# Patient Record
Sex: FEMALE | Race: WHITE | NOT HISPANIC OR LATINO | Employment: FULL TIME | URBAN - NONMETROPOLITAN AREA
[De-identification: names, ages, dates, MRNs, and addresses within clinical notes are randomized per-mention and may not be internally consistent; named-entity substitution may affect disease eponyms.]

---

## 2017-02-17 ENCOUNTER — TRANSCRIBE ORDERS (OUTPATIENT)
Dept: ADMINISTRATIVE | Facility: HOSPITAL | Age: 57
End: 2017-02-17

## 2017-02-17 DIAGNOSIS — Z12.31 ENCOUNTER FOR SCREENING MAMMOGRAM FOR MALIGNANT NEOPLASM OF BREAST: Primary | ICD-10-CM

## 2017-02-17 PROCEDURE — G0123 SCREEN CERV/VAG THIN LAYER: HCPCS | Performed by: OBSTETRICS & GYNECOLOGY

## 2017-02-20 ENCOUNTER — LAB REQUISITION (OUTPATIENT)
Dept: LAB | Facility: HOSPITAL | Age: 57
End: 2017-02-20

## 2017-02-20 ENCOUNTER — HOSPITAL ENCOUNTER (OUTPATIENT)
Dept: MAMMOGRAPHY | Facility: HOSPITAL | Age: 57
Discharge: HOME | End: 2017-02-20
Attending: OBSTETRICS & GYNECOLOGY | Admitting: OBSTETRICS & GYNECOLOGY

## 2017-02-20 DIAGNOSIS — Z12.31 ENCOUNTER FOR SCREENING MAMMOGRAM FOR MALIGNANT NEOPLASM OF BREAST: ICD-10-CM

## 2017-02-20 DIAGNOSIS — Z12.72 ENCOUNTER FOR SCREENING FOR MALIGNANT NEOPLASM OF VAGINA: ICD-10-CM

## 2017-02-20 LAB
GEN CATEG CVX/VAG CYTO-IMP: NORMAL
LAB AP CASE REPORT: NORMAL
LAB AP GYN ADDITIONAL INFORMATION: NORMAL
Lab: NORMAL
PATH INTERP SPEC-IMP: NORMAL
STAT OF ADQ CVX/VAG CYTO-IMP: NORMAL

## 2017-02-20 PROCEDURE — G0202 SCR MAMMO BI INCL CAD: HCPCS

## 2017-02-20 PROCEDURE — 77063 BREAST TOMOSYNTHESIS BI: CPT

## 2018-05-08 ENCOUNTER — OFFICE VISIT (OUTPATIENT)
Dept: GASTROENTEROLOGY | Facility: CLINIC | Age: 58
End: 2018-05-08

## 2018-05-08 VITALS
BODY MASS INDEX: 31.18 KG/M2 | OXYGEN SATURATION: 98 % | WEIGHT: 176 LBS | HEIGHT: 63 IN | HEART RATE: 67 BPM | DIASTOLIC BLOOD PRESSURE: 72 MMHG | SYSTOLIC BLOOD PRESSURE: 144 MMHG | TEMPERATURE: 97.5 F

## 2018-05-08 DIAGNOSIS — Z12.11 COLON CANCER SCREENING: Primary | ICD-10-CM

## 2018-05-08 PROCEDURE — S0285 CNSLT BEFORE SCREEN COLONOSC: HCPCS | Performed by: NURSE PRACTITIONER

## 2018-05-08 RX ORDER — FUROSEMIDE 20 MG/1
20 TABLET ORAL DAILY
Status: ON HOLD | COMMUNITY
End: 2018-07-19

## 2018-05-08 RX ORDER — SODIUM, POTASSIUM,MAG SULFATES 17.5-3.13G
2 SOLUTION, RECONSTITUTED, ORAL ORAL ONCE
Qty: 2 BOTTLE | Refills: 0 | Status: SHIPPED | OUTPATIENT
Start: 2018-05-08 | End: 2018-05-08

## 2018-05-08 RX ORDER — LEVOTHYROXINE SODIUM 88 UG/1
88 TABLET ORAL DAILY
Status: ON HOLD | COMMUNITY
End: 2018-07-19

## 2018-05-08 RX ORDER — ASPIRIN 81 MG/1
81 TABLET ORAL DAILY
COMMUNITY

## 2018-05-08 RX ORDER — LOVASTATIN 10 MG/1
10 TABLET ORAL NIGHTLY
Status: ON HOLD | COMMUNITY
End: 2018-07-19

## 2018-05-08 RX ORDER — LOSARTAN POTASSIUM AND HYDROCHLOROTHIAZIDE 12.5; 5 MG/1; MG/1
1 TABLET ORAL DAILY
Status: ON HOLD | COMMUNITY
End: 2018-07-19

## 2018-05-08 RX ORDER — POTASSIUM CHLORIDE 750 MG/1
10 TABLET, EXTENDED RELEASE ORAL 2 TIMES DAILY
Status: ON HOLD | COMMUNITY
End: 2018-07-19

## 2018-05-08 NOTE — PROGRESS NOTES
Chief Complaint   Patient presents with   • Colon Cancer Screening     Patient is here today for colon screening.     Subjective   HPI  Lola Batres is a 58 y.o. female who presents as a referral for preventative maintenance. She has no complaints of nausea or vomiting. No change in bowels. No wt loss. No BRBPR. No melena. The patient is adopted and does not know of family hx for colon cancer. No abdominal pain.The patient's last colonoscopy was noted on 6/30/11 with diverticulosis only otherwise normal.  The patient does not carry a history of colon polyps  Past Medical History:   Diagnosis Date   • Diabetes mellitus    • HTN (hypertension)    • Hypothyroidism      Past Surgical History:   Procedure Laterality Date   • BREAST EXCISIONAL BIOPSY Left     dr. Amado   • CARPAL TUNNEL RELEASE     • CHOLECYSTECTOMY     • COLONOSCOPY  06/03/2011       Current Outpatient Prescriptions:   •  aspirin 81 MG EC tablet, Take 81 mg by mouth Daily., Disp: , Rfl:   •  furosemide (LASIX) 20 MG tablet, Take 20 mg by mouth Daily., Disp: , Rfl:   •  levothyroxine (SYNTHROID, LEVOTHROID) 88 MCG tablet, Take 88 mcg by mouth Daily., Disp: , Rfl:   •  losartan-hydrochlorothiazide (HYZAAR) 50-12.5 MG per tablet, Take 1 tablet by mouth Daily., Disp: , Rfl:   •  lovastatin (MEVACOR) 10 MG tablet, Take 10 mg by mouth Every Night., Disp: , Rfl:   •  potassium chloride (K-DUR,KLOR-CON) 10 MEQ CR tablet, Take 10 mEq by mouth 2 (Two) Times a Day., Disp: , Rfl:   •  SITagliptin-MetFORMIN HCl ER (JANUMET XR)  MG tablet, Take 1 tablet by mouth Daily., Disp: , Rfl:   •  sodium-potassium-magnesium sulfates (SUPREP BOWEL PREP KIT) 17.5-3.13-1.6 GM/180ML solution oral solution, Take 2 bottles by mouth 1 (One) Time for 1 dose. Split dose prep as directed by office instructions provided.  2 bottles = one kit., Disp: 2 bottle, Rfl: 0  Allergies   Allergen Reactions   • Hydrocodone-Acetaminophen Swelling   • Penicillins Rash     Social History  "    Social History   • Marital status:      Spouse name: N/A   • Number of children: N/A   • Years of education: N/A     Occupational History   • Not on file.     Social History Main Topics   • Smoking status: Former Smoker   • Smokeless tobacco: Never Used   • Alcohol use No   • Drug use: Unknown   • Sexual activity: Not on file     Other Topics Concern   • Not on file     Social History Narrative   • No narrative on file     Family History   Problem Relation Age of Onset   • Adopted: Yes       REVIEW OF SYSTEMS  General: well appearing, no fever chills or sweats, no unexplained wt loss  HEENT: no acute visual or hearing disturbances  Cardiovascular: No chest pain or palpitations  Pulmonary: No shortness of breath, coughing, wheezing or hemoptysis  : No burning, urgency, hematuria, or dysuria  Musculoskeletal: No joint pain or stiffness  Peripheral: no edema  Skin: No lesions or rashes  Neuro: No dizziness, headaches, stroke, syncope  Endocrine: No hot or cold intolerances  Hematological: No blood dyscrasias    Objective   Vitals:    05/08/18 0813   BP: 144/72   Pulse: 67   Temp: 97.5 °F (36.4 °C)   SpO2: 98%   Weight: 79.8 kg (176 lb)   Height: 160 cm (63\")     Body mass index is 31.18 kg/m².    PHYSICAL EXAM  General: age appropriate well nourished well appearing, no acute distress  Head: normocephalic and atraumatic  Global assessment-supple  Neck-No JVD noted, no lymphadenopathy  Pulmonary-clear to auscultation bilaterally, normal respiratory effort  Cardiovascular-normal rate and rhythm, normal heart sounds, S1 and S2 noted  Abdomen-soft, non tender, non distended, normal bowel sounds all 4 quadrants, no hepatosplenomegaly noted  Extremities-No clubbing cyanosis or edema  Neuro-Non focal, converses appropriately, awake, alert, oriented    Imaging Results (most recent)     None        Assessment/Plan   Lola was seen today for colon cancer screening.    Diagnoses and all orders for this " visit:    Colon cancer screening  -     Case Request; Standing    Other orders  -     Implement Anesthesia Orders Day of Procedure; Standing  -     Obtain Informed Consent; Standing  -     Verify bowel prep was successful; Standing  -     sodium-potassium-magnesium sulfates (SUPREP BOWEL PREP KIT) 17.5-3.13-1.6 GM/180ML solution oral solution; Take 2 bottles by mouth 1 (One) Time for 1 dose. Split dose prep as directed by office instructions provided.  2 bottles = one kit.      COLONOSCOPY WITH ANESTHESIA (N/A)    Body mass index is 31.18 kg/m². Patient's Body mass index is 31.18 kg/m². BMI is above normal parameters. Recommendations include: no follow-up required.      All risks, benefits, alternatives, and indications of colonoscopy procedure have been discussed with the patient. Risks to include perforation of the colon requiring possible surgery or colostomy, risk of bleeding from biopsies or removal of colon tissue, possibility of missing a colon polyp or cancer, or adverse drug reaction.  Benefits to include the diagnosis and management of disease of the colon and rectum. Alternatives to include barium enema, radiographic evaluation, lab testing or no intervention. Pt verbalizes understanding and agrees.

## 2018-07-19 ENCOUNTER — ANESTHESIA (OUTPATIENT)
Dept: GASTROENTEROLOGY | Facility: HOSPITAL | Age: 58
End: 2018-07-19

## 2018-07-19 ENCOUNTER — HOSPITAL ENCOUNTER (OUTPATIENT)
Facility: HOSPITAL | Age: 58
Setting detail: HOSPITAL OUTPATIENT SURGERY
Discharge: HOME | End: 2018-07-19
Attending: INTERNAL MEDICINE | Admitting: INTERNAL MEDICINE

## 2018-07-19 ENCOUNTER — TELEPHONE (OUTPATIENT)
Dept: GASTROENTEROLOGY | Facility: CLINIC | Age: 58
End: 2018-07-19

## 2018-07-19 ENCOUNTER — ANESTHESIA EVENT (OUTPATIENT)
Dept: GASTROENTEROLOGY | Facility: HOSPITAL | Age: 58
End: 2018-07-19

## 2018-07-19 VITALS
HEIGHT: 63 IN | OXYGEN SATURATION: 95 % | BODY MASS INDEX: 29.95 KG/M2 | HEART RATE: 75 BPM | SYSTOLIC BLOOD PRESSURE: 99 MMHG | TEMPERATURE: 96.8 F | DIASTOLIC BLOOD PRESSURE: 65 MMHG | RESPIRATION RATE: 17 BRPM | WEIGHT: 169 LBS

## 2018-07-19 LAB — GLUCOSE BLDC GLUCOMTR-MCNC: 107 MG/DL (ref 70–130)

## 2018-07-19 PROCEDURE — G0121 COLON CA SCRN NOT HI RSK IND: HCPCS | Performed by: INTERNAL MEDICINE

## 2018-07-19 PROCEDURE — 82962 GLUCOSE BLOOD TEST: CPT

## 2018-07-19 PROCEDURE — 25010000002 PROPOFOL 10 MG/ML EMULSION: Performed by: NURSE ANESTHETIST, CERTIFIED REGISTERED

## 2018-07-19 RX ORDER — SODIUM CHLORIDE 9 MG/ML
500 INJECTION, SOLUTION INTRAVENOUS CONTINUOUS PRN
Status: DISCONTINUED | OUTPATIENT
Start: 2018-07-19 | End: 2018-07-19 | Stop reason: HOSPADM

## 2018-07-19 RX ORDER — SODIUM CHLORIDE 0.9 % (FLUSH) 0.9 %
3 SYRINGE (ML) INJECTION AS NEEDED
Status: DISCONTINUED | OUTPATIENT
Start: 2018-07-19 | End: 2018-07-19 | Stop reason: HOSPADM

## 2018-07-19 RX ORDER — PROPOFOL 10 MG/ML
VIAL (ML) INTRAVENOUS AS NEEDED
Status: DISCONTINUED | OUTPATIENT
Start: 2018-07-19 | End: 2018-07-19 | Stop reason: SURG

## 2018-07-19 RX ORDER — LIDOCAINE HYDROCHLORIDE 20 MG/ML
INJECTION, SOLUTION INFILTRATION; PERINEURAL AS NEEDED
Status: DISCONTINUED | OUTPATIENT
Start: 2018-07-19 | End: 2018-07-19 | Stop reason: SURG

## 2018-07-19 RX ADMIN — SODIUM CHLORIDE 500 ML: 9 INJECTION, SOLUTION INTRAVENOUS at 10:10

## 2018-07-19 RX ADMIN — PROPOFOL 430 MG: 10 INJECTION, EMULSION INTRAVENOUS at 11:01

## 2018-07-19 RX ADMIN — LIDOCAINE HYDROCHLORIDE 50 MG: 20 INJECTION, SOLUTION INFILTRATION; PERINEURAL at 11:01

## 2018-07-19 RX ADMIN — LIDOCAINE HYDROCHLORIDE 0.5 ML: 10 INJECTION, SOLUTION EPIDURAL; INFILTRATION; INTRACAUDAL; PERINEURAL at 10:10

## 2018-07-19 NOTE — ANESTHESIA PREPROCEDURE EVALUATION
Anesthesia Evaluation     Patient summary reviewed   no history of anesthetic complications:  NPO Solid Status: > 8 hours  NPO Liquid Status: > 2 hours           Airway   Mallampati: I  TM distance: <3 FB  Neck ROM: full  Dental    (+) partials        Pulmonary    (+) a smoker Former,   (-) asthma, sleep apnea  Cardiovascular   Exercise tolerance: good (4-7 METS)    (+) hypertension, valvular problems/murmurs murmur, hyperlipidemia,       Neuro/Psych  (-) seizures, TIA, CVA  GI/Hepatic/Renal/Endo    (+)   diabetes mellitus, hypothyroidism,   (-) liver disease, no renal disease    Musculoskeletal     Abdominal    Substance History      OB/GYN          Other                        Anesthesia Plan    ASA 2     general   total IV anesthesia  intravenous induction   Anesthetic plan and risks discussed with patient.

## 2018-07-19 NOTE — H&P
Chief Complaint:   Screening    Subjective     HPI:   Patient presents for screening colonoscopy.  Her last one was 7 years ago and was normal.  Her family history however is unknown due to adopted status.    Past Medical History:   Past Medical History:   Diagnosis Date   • Diabetes mellitus (CMS/HCC)    • Elevated cholesterol    • Heart murmur    • HTN (hypertension)    • Hypothyroidism        Past Surgical History:  Past Surgical History:   Procedure Laterality Date   • BREAST EXCISIONAL BIOPSY Left     dr. Amado   • CARPAL TUNNEL RELEASE     • CHOLECYSTECTOMY     • COLONOSCOPY  06/03/2011        Family History:  Family History   Problem Relation Age of Onset   • Adopted: Yes   • Leukemia Mother    • Heart disease Father        Social History:   reports that she has quit smoking. Her smoking use included Cigarettes. She has never used smokeless tobacco. She reports that she does not drink alcohol or use drugs.    Medications:   Prescriptions Prior to Admission   Medication Sig Dispense Refill Last Dose   • aspirin 81 MG EC tablet Take 81 mg by mouth Daily.   7/16/2018 at Unknown time   • furosemide (LASIX) 20 MG tablet Take 1 tablet by mouth every day 90 tablet 2 Past Week at Unknown time   • losartan-hydrochlorothiazide (HYZAAR) 50-12.5 MG per tablet Take 1 tablet by mouth every day 90 tablet 2 7/19/2018 at 0430   • cycloSPORINE (RESTASIS) 0.05 % ophthalmic emulsion Instill 1 drop Both Eyes twice a day 180 each 3 Unknown at Unknown time   • levothyroxine (SYNTHROID, LEVOTHROID) 88 MCG tablet Take 1 tablet by mouth every morning 90 tablet 2 7/16/2018   • lovastatin (MEVACOR) 10 MG tablet Take 1 tablet by mouth every day 90 tablet 2 7/16/2018   • potassium chloride (K-DUR) 10 MEQ CR tablet Take 1 tablet by mouth daily. 30 tablet 0 7/16/2018   • sitaGLIPtin-metFORMIN (JANUMET)  MG per tablet Take 1 tablet by mouth two times a day 180 tablet 2 7/16/2018   • Triamcinolone Acetonide (NASACORT) 55 MCG/ACT  "nasal inhaler Instill 2 sprays into each nostril daily 10.8 mL 11 7/16/2018       Allergies:  Hydrocodone-acetaminophen and Penicillins    ROS:    General: Weight stable  Resp: No SOA  Cardiovascular: No CP      Objective     /93 (BP Location: Right arm, Patient Position: Sitting)   Pulse 79   Temp 96.8 °F (36 °C) (Temporal Artery )   Resp 20   Ht 160 cm (63\")   Wt 76.7 kg (169 lb)   SpO2 97%   Breastfeeding? No   BMI 29.94 kg/m²     Physical Exam   Constitutional: Pt is oriented to person, place, and in no distress.  Eyes: No icterus  ENMT: Unremarkable   Cardiovascular: Normal rate, regular rhythm.    Pulmonary/Chest: No distress.  No audible wheezes  Abdominal: Soft.   Skin: Skin is warm and dry.   Psychiatric: Mood, memory, affect and judgment appear normal.     Assessment/Plan     Diagnosis:  Screening    Anticipated Surgical Procedure:  Colonoscopy    The risks, benefits, and alternatives of colonoscopy were reviewed with the patient today.  Risks including perforation of the colon possibly requiring surgery or colostomy.  Additional risks include risk of bleeding from biopsies or removal of colon tissue.  There is also the risk of a drug reaction or problems with anesthesia.  This will be discussed with the further by the anesthesia team on the day of the procedure.  Lastly there is a possibility of missing a colon polyp or cancer.  The benefits include the diagnosis and management of disease of the colon and rectum.  Alternatives to colonoscopy include barium enema, laboratory testing, radiographic evaluation, or no intervention.  The patient verbalizes understanding and agrees.    Much of this encounter note is an electronic transcription/translation of spoken language to printed text. The electronic translation of spoken language may permit erroneous, or at times, nonsensical words or phrases to be inadvertently transcribed; although I have reviewed the note for such errors, some may still " exist.

## 2018-11-14 ENCOUNTER — TRANSCRIBE ORDERS (OUTPATIENT)
Dept: ADMINISTRATIVE | Facility: HOSPITAL | Age: 58
End: 2018-11-14

## 2018-11-14 DIAGNOSIS — G47.30 SLEEP APNEA, UNSPECIFIED TYPE: Primary | ICD-10-CM

## 2019-01-02 ENCOUNTER — DOCUMENTATION (OUTPATIENT)
Dept: HOSPICE | Facility: HOSPITAL | Age: 59
End: 2019-01-02

## 2019-01-02 NOTE — ACP (ADVANCE CARE PLANNING)
Date of [facility] ACP interview: 1/2/2019  Location of interview: Office   [facility] ACP Facilitator: Joya William RN  Referral Source: patient  Present for facilitation: Patient    SUMMARY OF ADVANCE CARE PLANNING DISCUSSION:  Lola presents for [facility] facilitation. We reviewed purpose and goals for advance care planning.    I reviewed with Lola qualities to consider when choosing a healthcare agent. Lola had selected her son Jose A as her healthcare agent. I encouraged Lola to discuss her preferences for future care with healthcare agents and others close to her.    Lola describes past experiences dealing with friends or family who have been seriously ill: She has witnessed others kept alive on machines. From these experiences Lola learned she does not want her life prolonged by mechanical means if she cannot recover.    Lola identified the following as most important to living well: Being able to care for herself and know those around her.    Lola describes cultural, Adventist, spiritual or personal practices/beliefs that are important to her or might help her choose the care wanted, or not wanted: none    Goals of medical care for severe, permanent brain injury were explored: Yes     Education materials were provided on: Advance Care Planning    Each section of the advance care/living will document was reviewed and discussed.    Advance care/living will document finished during this facilitation? yes    Time spent on preparation, facilitation and documentation was 31-60 minutes.    RECOMMENDATIONS/FOLLOW-UP:  Completed, faxed to HIM, copies to patient.    CONSULT/NOTE ROUTED  yes    Joya William RN

## 2019-01-22 ENCOUNTER — TRANSCRIBE ORDERS (OUTPATIENT)
Dept: SLEEP MEDICINE | Facility: HOSPITAL | Age: 59
End: 2019-01-22

## 2019-01-22 DIAGNOSIS — G47.30 SLEEP APNEA, UNSPECIFIED TYPE: Primary | ICD-10-CM

## 2019-01-24 ENCOUNTER — HOSPITAL ENCOUNTER (OUTPATIENT)
Dept: SLEEP MEDICINE | Facility: HOSPITAL | Age: 59
Discharge: HOME | End: 2019-01-24
Admitting: NURSE PRACTITIONER

## 2019-01-24 VITALS
HEIGHT: 63 IN | HEART RATE: 62 BPM | RESPIRATION RATE: 14 BRPM | BODY MASS INDEX: 31.01 KG/M2 | DIASTOLIC BLOOD PRESSURE: 86 MMHG | OXYGEN SATURATION: 97 % | SYSTOLIC BLOOD PRESSURE: 156 MMHG | WEIGHT: 175 LBS

## 2019-01-24 DIAGNOSIS — G47.30 SLEEP APNEA, UNSPECIFIED TYPE: ICD-10-CM

## 2019-01-24 PROCEDURE — 95810 POLYSOM 6/> YRS 4/> PARAM: CPT | Performed by: PSYCHIATRY & NEUROLOGY

## 2019-01-24 PROCEDURE — 95810 POLYSOM 6/> YRS 4/> PARAM: CPT

## 2019-01-30 ENCOUNTER — TRANSCRIBE ORDERS (OUTPATIENT)
Dept: SLEEP MEDICINE | Facility: HOSPITAL | Age: 59
End: 2019-01-30

## 2019-01-30 DIAGNOSIS — G47.33 OBSTRUCTIVE SLEEP APNEA, ADULT: Primary | ICD-10-CM

## 2019-02-14 ENCOUNTER — HOSPITAL ENCOUNTER (OUTPATIENT)
Dept: SLEEP MEDICINE | Facility: HOSPITAL | Age: 59
Discharge: HOME | End: 2019-02-14
Attending: PSYCHIATRY & NEUROLOGY | Admitting: PSYCHIATRY & NEUROLOGY

## 2019-02-14 VITALS
HEIGHT: 63 IN | BODY MASS INDEX: 31.36 KG/M2 | WEIGHT: 177 LBS | RESPIRATION RATE: 14 BRPM | SYSTOLIC BLOOD PRESSURE: 147 MMHG | DIASTOLIC BLOOD PRESSURE: 85 MMHG | OXYGEN SATURATION: 95 % | HEART RATE: 75 BPM

## 2019-02-14 DIAGNOSIS — G47.33 OBSTRUCTIVE SLEEP APNEA, ADULT: ICD-10-CM

## 2019-02-14 PROCEDURE — 95811 POLYSOM 6/>YRS CPAP 4/> PARM: CPT

## 2019-02-14 PROCEDURE — 95811 POLYSOM 6/>YRS CPAP 4/> PARM: CPT | Performed by: PSYCHIATRY & NEUROLOGY

## 2019-03-28 ENCOUNTER — OFFICE VISIT (OUTPATIENT)
Dept: NEUROLOGY | Facility: CLINIC | Age: 59
End: 2019-03-28

## 2019-03-28 VITALS
WEIGHT: 147 LBS | DIASTOLIC BLOOD PRESSURE: 62 MMHG | HEART RATE: 72 BPM | BODY MASS INDEX: 26.05 KG/M2 | HEIGHT: 63 IN | SYSTOLIC BLOOD PRESSURE: 120 MMHG

## 2019-03-28 DIAGNOSIS — I49.49 EXTRASYSTOLES: ICD-10-CM

## 2019-03-28 DIAGNOSIS — R05.9 COUGH: ICD-10-CM

## 2019-03-28 DIAGNOSIS — I10 HYPERTENSION, UNSPECIFIED TYPE: ICD-10-CM

## 2019-03-28 DIAGNOSIS — R01.1 MURMUR: ICD-10-CM

## 2019-03-28 DIAGNOSIS — Z99.89 OSA ON CPAP: Primary | ICD-10-CM

## 2019-03-28 DIAGNOSIS — R09.89 CHEST CONGESTION: ICD-10-CM

## 2019-03-28 DIAGNOSIS — R06.02 SOB (SHORTNESS OF BREATH): ICD-10-CM

## 2019-03-28 DIAGNOSIS — G47.33 OSA ON CPAP: Primary | ICD-10-CM

## 2019-03-28 PROCEDURE — 99214 OFFICE O/P EST MOD 30 MIN: CPT | Performed by: NURSE PRACTITIONER

## 2019-03-28 NOTE — PROGRESS NOTES
Subjective     Chief Complaint   Patient presents with   • Sleep Apnea       Lola Batres is a 59 y.o. female who presents today for follow-up of JOCELINE.  She was diagnosed with sleep apnea in January 2019 after she underwent PSG testing.  She was then scheduled for a titration study at which point she was placed on CPAP at 8 cm H2O.  She states she is tolerating that pressure well. She is using nasal pillows and [employer] for dme supples. She does feel like her sleep is restored but she continues to wake up needing to go to the restroom. She states that she is still snoring with her therapy but she has had a head cold that she is being treated for. She does have SOB right now and states she was given an injection for symptom by her PCP a couple days ago which has not helped.. She denies fevers, chest pain, palpitations. She does have a known murmur she states that her PCP follows.  It was noted she had extrasystoles for approximately 502 minutes during titration study.  She does state that she has restless legs . She denies sleep paralysis or cataplectic events. She does drink coffee and several cokes through the day. She denies alcohol use, tobacco use or illicit drug use.  Prior to sleep study she had witnessed apnea and snoring with daytime fatigue. She would awaken with morning headaches., irritability, and dry mouth.     Compliance report discussed in office today.  Patient has worn her machine greater than or equal to 4 hours 87% of the time.  Her AHI on current therapy is 1.3.  There are no large leaks noted.                                                      [place] Sleepiness Scale    Situation Chance of Dozing or Sleeping   • Sitting and reading 1 - slight chance of dosing or sleeping   • Watching TV 0 - would never dose or sleep   • Sitting inactive in a public place 1 - slight chance of dosing or sleeping   • Being a passenger in a motor vehicle for an hour or more 0 - would never dose or sleep  "  • Lying down in the afternoon 0 - would never dose or sleep   • Sitting and talking to someone 0 - would never dose or sleep   • Sitting quietly after lunch (no alcohol) 0 - would never dose or sleep   • Stopped for a few minutes in traffic while driving 0 - would never dose or sleep   Total score (add the scores up) 1           Does the patient SNORE? No    Does the patient feel TIRED, fatigued or sleepy during the day? No    Has anyone OBSERVED the stop breathing or cough/gasp during sleep? No    Does the patient have high blood PRESSURE? Yes    Is the patient's BMI greater than 35? No    Is the patient’s AGE over 50 years old? Yes    Is the patient's NECK size greater than 17 in for a male and 16 in for a female? Yes    Is the patient’s GENDER male? No      0-2 \"Yes\" Responses = Low Risk of JOCELINE  3-4 \"Yes\" Responses = Intermediate Risk of JOCELINE  5-8 \"Yes\" Responses = High Risk JOCELINE    Adapted from STOP-BANG Questionnaire  Talbot F et al. Anesthesiology 2008;108:812-21.          Neurologic Problem   Primary symptoms comment: joceline. This is a new problem. The neurological problem developed gradually. The problem has been gradually improving since onset. Associated symptoms include fatigue and shortness of breath. Pertinent negatives include no chest pain or palpitations. (Daytime somnolence, fatigue, witnessed apnea, snoring, morning headaches) Treatments tried: cpap. (Murmur, DM, HTN)        Current Outpatient Medications   Medication Sig Dispense Refill   • aspirin 81 MG EC tablet Take 81 mg by mouth Daily.     • cycloSPORINE (RESTASIS) 0.05 % ophthalmic emulsion Instill 1 drop Both Eyes twice a day 180 each 3   • furosemide (LASIX) 20 MG tablet Take 1 tablet by mouth daily 90 tablet 2   • levothyroxine (SYNTHROID, LEVOTHROID) 88 MCG tablet Take 1 tablet by mouth daily 90 tablet 2   • losartan-hydrochlorothiazide (HYZAAR) 50-12.5 MG per tablet Take 1 tablet by mouth daily 90 tablet 2   • lovastatin (MEVACOR) 10 MG " tablet Take 1 tablet by mouth every day 90 tablet 2   • potassium chloride (K-DUR) 10 MEQ CR tablet Take 1 tablet by mouth daily. 30 tablet 0   • sitaGLIPtin-metFORMIN (JANUMET)  MG per tablet Take 1 tablet by mouth two times a day 180 tablet 2   • sitaGLIPtin-metFORMIN (JANUMET)  MG per tablet Take 1 tablet by mouth two times a day 180 tablet 2   • Triamcinolone Acetonide (NASACORT) 55 MCG/ACT nasal inhaler Instill 2 sprays into each nostril daily 10.8 mL 11     No current facility-administered medications for this visit.        Past Medical History:   Diagnosis Date   • Diabetes mellitus (CMS/HCC)    • Elevated cholesterol    • Heart murmur    • HTN (hypertension)    • Hypothyroidism        Past Surgical History:   Procedure Laterality Date   • BREAST EXCISIONAL BIOPSY Left     dr. Amado   • CARPAL TUNNEL RELEASE     • CHOLECYSTECTOMY     • COLONOSCOPY  06/03/2011   • COLONOSCOPY N/A 7/19/2018    Procedure: COLONOSCOPY WITH ANESTHESIA;  Surgeon: Benita Yu MD;  Location: [facility] ENDOSCOPY;  Service: Gastroenterology       family history includes Heart disease in her father; Leukemia in her mother. She was adopted.    Social History     Tobacco Use   • Smoking status: Former Smoker     Types: Cigarettes   • Smokeless tobacco: Never Used   Substance Use Topics   • Alcohol use: No   • Drug use: No       Review of Systems   Constitutional: Positive for fatigue.   HENT: Positive for congestion.    Eyes: Negative.    Respiratory: Positive for cough and shortness of breath.    Cardiovascular: Negative.  Negative for chest pain and palpitations.   Gastrointestinal: Negative.    Endocrine: Negative.    Genitourinary: Negative.    Musculoskeletal: Negative.    Skin: Negative.    Allergic/Immunologic: Negative.    Neurological: Negative.    Hematological: Negative.    Psychiatric/Behavioral: Negative.    All other systems reviewed and are negative.      Objective     /62 (BP Location: Left arm, Patient  "Position: Sitting)   Pulse 72   Ht 160 cm (63\")   Wt 66.7 kg (147 lb)   Breastfeeding? No   BMI 26.04 kg/m² , Body mass index is 26.04 kg/m².    Physical Exam   Constitutional: She is oriented to person, place, and time. She appears well-developed and well-nourished.   HENT:   Head: Normocephalic and atraumatic.   Mallampati class III   Eyes: EOM are normal. Pupils are equal, round, and reactive to light.   Neck: Normal range of motion. Neck supple.   Cardiovascular: Normal rate and intact distal pulses.   Murmur heard.  Pulmonary/Chest: Effort normal. No respiratory distress.   No adventitious sounds noted with auscultation, mild decreased lung sounds in bilateral LL.    Abdominal: Soft.   Musculoskeletal: Normal range of motion.   Neurological: She is alert and oriented to person, place, and time. She has normal strength and normal reflexes. She displays a negative Romberg sign.   Skin: Skin is warm and dry. Capillary refill takes less than 2 seconds.   Psychiatric: She has a normal mood and affect. Her speech is normal and behavior is normal. Cognition and memory are normal.   Nursing note and vitals reviewed.        Results for orders placed or performed during the hospital encounter of 07/19/18   POC Glucose Once   Result Value Ref Range    Glucose 107 70 - 130 mg/dL        SLEEP STUDY REPORT-titration     REFERRING PHYSICIAN:  Kulwinder Saxena MD     HISTORY OF PRESENT ILLNESS: Patient with history of JOCELINE for titration.     FINDINGS ON STUDY: Patient titrated to 8 cm of water CPAP with heated humidification.  Patient had extrasystoles noted time of bed 502.5 minutes.  Total sleep time 411.5 minutes.  Sleep efficiency 82%.  Latency to sleep was 34.5 minutes.  Latency to REM to 76.5 minutes.  REM is 10.2%.  Stage I 5.1%.  Stage II 59.8%.  Stage III 24.9%.  AHI 0.3.  Patient was supine the entire time.  Average pulse rate was 61.8 bpm with highest pulse rate 75 bpm lowest pulse rate 49 bpm.  Patient spent " 3.3 minutes in the 80-89% oxygen range with 0.6 minutes less than 89% oxygen saturation.  PLM index 11.4.  Low SpO2 is 87%.     IMPRESSION:    Axis A: Obstructive sleep apnea G 47.33  Axis A 2: Periodic leg movements G 47.61  Axis B 1: CPAP at 8 cm of water pressure with heated humidification  Axis B 2: Further evaluation and treatment of patient's periodic leg movements needs to be followed symptomatically  Axis C: Underlying medical problems as previously addressed need to be followed.  Cardiac irregularities noted in need to be followed by patient's PCP [place] P[place]agua.  Close follow-up with patient's family physician emphasis on safety to be accomplished.    SLEEP STUDY REPORT     REFERRING PHYSICIAN:  ROMI Lu     HISTORY OF PRESENT ILLNESS:  Patient is 58 years old.  Patient has height of 63 inches.  Patient has weight of 175 pounds.  Patient has BMI of 31.  Patient has [place] sleepiness scale 9.  Patient has neck circumference 15.5 inches.  Sleep questionnaire is not available for review.  Patient supposedly has witnessed apneas and snoring.  Patient is on potassium, Janumet, Lasix, hydrochlorothiazide, losartan, levothyroxine, lovastatin, triamcinolone nasal, aspirin, Restasis, Nasacort.  Patient complains of fatigue, waking up with headache.     FINDINGS ON STUDY:  Time in bed 424 minutes with total sleep time 394 minutes in sleep efficiency 93%.  Latency to sleep 9.5 minutes.  Latency to  minutes.  REM is 11.7%.  Stage I 3.3%.  Stage II 59.4%.  Stage III 25.6%.  AHI 5.9 with mostly unclassified hypopnea probably obstructive.  Patient was supine the entire time.  Average pulse rate 69.3 bpm with highest pulse rate 94 bpm and lowest pulse rate 58 bpm.  Low SpO2 is 74%.  PLM index 5.2.  Patient spent 38.6 minutes in the 80-89% oxygen saturation range with 1.5 minutes in the 70-79% oxygen saturation range in 24.8 minutes below 89% oxygen saturation.  Some cardiac irregularities noted  during the study     IMPRESSION:    Axis A 1: Obstructive sleep apnea G 47.33  Axis A 2: Periodic leg movements G 47.61  Axis B 1: CPAP or BiPAP titration with O2 protocol as indicated and in  attended study is preferred  Axis B 2: Further evaluation and treatment of patient's periodic leg movements to be defined after use of CPAP or BiPAP  Axis C: Underlying medical problems and medication effects may be contributory.  Weight loss program may be helpful if clinically indicated.  Further cardiac evaluation may be helpful if clinically indicated.  Close follow-up with patient's family physician with emphasis on safety to be accomplished.    ASSESSMENT/PLAN    Lola was seen today for sleep apnea.    Diagnoses and all orders for this visit:    JOCELINE on CPAP  -     Overnight Sleep Oximetry Study; Future    Extrasystoles    Hypertension, unspecified type    Murmur    Cough    Chest congestion    SOB (shortness of breath)          Allergies and all known medications/prescriptions have been reviewed using resources available on this encounter.    Patient's Body mass index is 26.04 kg/m². BMI is above normal parameters. Recommendations include: referral to primary care.    Return in about 4 weeks (around 2019).    MEDICAL DECISION MAKIN.  Compliance report today in the office.  Patient is currently compliant with her therapy per insurance guidelines.  Her AHI is 1.3 which is very good on current setting.  She is tolerating mask well.  She is to continue same therapy.  I would like to repeat an overnight pulse oximetry on current therapy after she is recovered from current upper respiratory infection.  She is agreeable with this.  2.  Counseled on multimodal approach to treatment of sleep apnea including but not limited to diet, exercise, sleep hygiene.  Risk of untreated sleep apnea were discussed in office today to include but not limited to increased risk of hypertension, heart attack, stroke,  cardiac arrhythmia such as atrial fibrillation.  3.  BP to be managed per PCP.  I did discuss extrasystoles noted on recent PSG.  Patient states she has had a known murmur and her primary care doctor does follow her for this.  She will discuss with primary care doctor.  A copy of her report has been sent over for him to review.  4.  No use of accessory muscles noted today on exam but obvious congestion.  She states that she feels like she has worsened a bit.  Did recommend she seek medical attention in urgent care or her primary care's office today for possible evaluation.  She is agreeable with this.  She did have decreased lung sounds noted to the bilateral lower lobes with no rhonchi, wheezing, use of accessory muscle.  She denies any chest pain but is very mildly short of breath at times.  5.  Hemoglobin A1c to be managed per PCP.      Lise Strauss, APRN

## 2019-04-24 ENCOUNTER — DOCUMENTATION (OUTPATIENT)
Dept: HOSPICE | Facility: HOSPITAL | Age: 59
End: 2019-04-24

## 2019-04-24 NOTE — ACP (ADVANCE CARE PLANNING)
Date of First Steps ACP interview: 4/24/2019  Location of interview: [facility] Office  First Steps ACP Facilitator: Damian Parisi  Referral Source: Patient  Present for facilitation: Patient    SUMMARY OF ADVANCE CARE PLANNING DISCUSSION:  Lola presents for First Steps facilitation. We reviewed purpose and goals for advance care planning.    I reviewed with Lola qualities to consider when choosing a healthcare agent. Lola had selected Jose A Montes Jr. And Natalia Mohr as her healthcare agents. I encouraged Lola to discuss her preferences for future care with healthcare agents and others close to her.    Each section of the advance care/living will document was reviewed and discussed.    Advance care/living will document finished during this facilitation? yes    Time spent on preparation, facilitation and documentation was under 30 minutes.    RECOMMENDATIONS/FOLLOW-UP:  Gave her copies to distribute to her surrogates and physicians.    CONSULT/NOTE ROUTED  Yes.    Damian Parisi

## 2019-06-28 ENCOUNTER — OFFICE VISIT (OUTPATIENT)
Dept: NEUROLOGY | Facility: CLINIC | Age: 59
End: 2019-06-28

## 2019-06-28 VITALS
RESPIRATION RATE: 18 BRPM | HEART RATE: 72 BPM | DIASTOLIC BLOOD PRESSURE: 78 MMHG | WEIGHT: 185 LBS | HEIGHT: 63 IN | SYSTOLIC BLOOD PRESSURE: 128 MMHG | BODY MASS INDEX: 32.78 KG/M2

## 2019-06-28 DIAGNOSIS — Z99.89 OSA ON CPAP: Primary | ICD-10-CM

## 2019-06-28 DIAGNOSIS — G47.33 OSA ON CPAP: Primary | ICD-10-CM

## 2019-06-28 PROCEDURE — 99213 OFFICE O/P EST LOW 20 MIN: CPT | Performed by: NURSE PRACTITIONER

## 2019-06-28 NOTE — PROGRESS NOTES
Subjective     Chief Complaint   Patient presents with   • Sleep Apnea       Lola Batres is a 59 y.o. female who presents today for follow-up of sleep apnea.    History of Present Illness  Ms. Batres is a pleasant 59-year-old female, employee here at [employer], who presents today alone for follow-up of sleep apnea.She is using [employer] and nasal pillows.She is cleaning her machine as recommended. She states she has not received replacement supplies. She denies snoring over therapy or gasping for air. She denies chest pain or bloating with use of her machine.   She feels like her sleep is restored and her energy has increased.  She denies morning headaches.  She denies any cataplectic or sleep paralysis events.  She has gained approximately 10 pounds since last office visit.  After review of her chart it does appear that her weight management entered incorrectly.  She normally weighs around 177-175.  Her [place] score today in the office is 1.  She denies falling asleep driving.  She denies excessive napping throughout the day.  She does feel like her sleep is restored.  She denies use of alcohol, tobacco, illegal substance.    Compliance report reviewed in office today.  She is worn her machine greater than or equal to 4 hours 93% of the time.  Her average nightly usage is 8 hours.  She is currently set at CPAP 8 cm H2O.  Her AHI on therapy is 1.5.      Current Outpatient Medications   Medication Sig Dispense Refill   • aspirin 81 MG EC tablet Take 81 mg by mouth Daily.     • cycloSPORINE (RESTASIS) 0.05 % ophthalmic emulsion Instill 1 drop Both Eyes twice a day 180 each 3   • furosemide (LASIX) 20 MG tablet Take 1 tablet by mouth daily 90 tablet 2   • levothyroxine (SYNTHROID, LEVOTHROID) 88 MCG tablet Take 1 tablet by mouth daily 90 tablet 2   • losartan-hydrochlorothiazide (HYZAAR) 50-12.5 MG per tablet Take 1 tablet by mouth daily 90 tablet 2   • lovastatin (MEVACOR) 10 MG tablet  Take 1 tablet by mouth every day 90 tablet 2   • potassium chloride (K-DUR) 10 MEQ CR tablet Take 1 tablet by mouth daily. 30 tablet 0   • sitaGLIPtin-metFORMIN (JANUMET)  MG per tablet Take 1 tablet by mouth two times a day 180 tablet 2   • sitaGLIPtin-metFORMIN (JANUMET)  MG per tablet Take 1 tablet by mouth two times a day 180 tablet 2   • Triamcinolone Acetonide (NASACORT) 55 MCG/ACT nasal inhaler Instill 2 sprays into each nostril daily 10.8 mL 11     No current facility-administered medications for this visit.        Past Medical History:   Diagnosis Date   • Diabetes mellitus (CMS/HCC)    • Elevated cholesterol    • Heart murmur    • HTN (hypertension)    • Hypothyroidism        Past Surgical History:   Procedure Laterality Date   • BREAST EXCISIONAL BIOPSY Left     dr. Amado   • CARPAL TUNNEL RELEASE     • CHOLECYSTECTOMY     • COLONOSCOPY  06/03/2011   • COLONOSCOPY N/A 7/19/2018    Procedure: COLONOSCOPY WITH ANESTHESIA;  Surgeon: Benita Yu MD;  Location: [facility] ENDOSCOPY;  Service: Gastroenterology       family history includes Heart disease in her father; Leukemia in her mother. She was adopted.    Social History     Tobacco Use   • Smoking status: Former Smoker     Types: Cigarettes   • Smokeless tobacco: Never Used   Substance Use Topics   • Alcohol use: No   • Drug use: No       Review of Systems   Constitutional: Negative.    HENT: Negative.    Eyes: Negative.    Respiratory: Positive for cough.    Cardiovascular: Negative.  Negative for chest pain and palpitations.   Gastrointestinal: Negative.    Endocrine: Negative.    Genitourinary: Negative.    Musculoskeletal: Negative.    Skin: Negative.    Allergic/Immunologic: Negative.    Neurological: Negative.    Hematological: Negative.    Psychiatric/Behavioral: Negative.    All other systems reviewed and are negative.      Objective     /78 (BP Location: Left arm, Patient Position: Sitting)   Pulse 72   Resp 18   Ht 160 cm  "(63\")   Wt 83.9 kg (185 lb)   Breastfeeding? No   BMI 32.77 kg/m² , Body mass index is 32.77 kg/m².    Physical Exam   Constitutional: She is oriented to person, place, and time. She appears well-developed and well-nourished.   HENT:   Head: Normocephalic and atraumatic.   Mallampati class II anatomy   Eyes: EOM are normal. Pupils are equal, round, and reactive to light.   Neck: Normal range of motion. Neck supple.   Cardiovascular: Normal rate.   Pulmonary/Chest: Effort normal.   Abdominal: Soft.   Musculoskeletal: Normal range of motion.   Neurological: She is alert and oriented to person, place, and time.   Skin: Skin is warm and dry.   Psychiatric: She has a normal mood and affect. Her behavior is normal.         ASSESSMENT/PLAN    Lola was seen today for sleep apnea.    Diagnoses and all orders for this visit:    JOCELINE on CPAP  -     Overnight Sleep Oximetry Study; Future          Allergies and all known medications/prescriptions have been reviewed using resources available on this encounter.    Patient's Body mass index is 32.77 kg/m². BMI is above normal parameters. Recommendations include: referral to primary care.    Return in about 1 year (around 2020) for Sleep FU.    MEDICAL DECISION MAKIN.  Compliance report discussed in office today.  She is compliant per insurance guidelines and is worn machine greater than or equal to 4 hours 93% the time.  Her AHI is well-controlled at 1.5 on current pressure setting.  She is to continue present therapy.  She is tolerating her mask.  She was to have an overnight pulse oximetry performed after last office visit when she recovered from respiratory illness.  She states this is never performed.  I placed another order for an overnight pulse oximetry to be performed on her CPAP to evaluate effectiveness.  We will see her back in the office in 1 year unless abnormalities are noted on overnight pulse ox at that point we will schedule her for sooner " evaluation.  2. Counseled on multimodal approach to treatment of sleep apnea to include but not limited to diet, exercise, sleep hygiene, compliance with pap therapy. Encouraged lateral sleeping position and to avoid sedatives or alcohol close to bedtime. Risks of untreated sleep apnea were discussed to include but not limited to HTN, heart disease, stroke, cardiac arrhythmia such as AFIB, and dementia.   3.  Recommend changing CPAP supplies as recommended per insurance guidelines.  Replacement schedule given to patient in office today.  She is to continue to clean her machine as she is now.      Lise Strauss, APRN

## 2019-07-09 DIAGNOSIS — G47.33 OSA ON CPAP: ICD-10-CM

## 2019-07-09 DIAGNOSIS — Z99.89 OSA ON CPAP: ICD-10-CM

## 2019-10-31 ENCOUNTER — OFFICE VISIT (OUTPATIENT)
Dept: NEUROLOGY | Facility: CLINIC | Age: 59
End: 2019-10-31

## 2019-10-31 VITALS
HEIGHT: 63 IN | BODY MASS INDEX: 31.89 KG/M2 | DIASTOLIC BLOOD PRESSURE: 82 MMHG | HEART RATE: 80 BPM | SYSTOLIC BLOOD PRESSURE: 134 MMHG | WEIGHT: 180 LBS | OXYGEN SATURATION: 98 %

## 2019-10-31 DIAGNOSIS — R01.1 MURMUR: ICD-10-CM

## 2019-10-31 DIAGNOSIS — Z99.89 OSA ON CPAP: Primary | ICD-10-CM

## 2019-10-31 DIAGNOSIS — I10 HYPERTENSION, UNSPECIFIED TYPE: ICD-10-CM

## 2019-10-31 DIAGNOSIS — G47.33 OSA ON CPAP: Primary | ICD-10-CM

## 2019-10-31 PROCEDURE — 99213 OFFICE O/P EST LOW 20 MIN: CPT | Performed by: PHYSICIAN ASSISTANT

## 2019-10-31 NOTE — PROGRESS NOTES
Neurology Progress Note      Chief Complaint:    Obstructive sleep apnea with CPAP compliance  BMI 31.9  Sinus arrhythmia with frequent ectopy  Heart murmur    Subjective     Subjective:  This is a 59-year-old female employee here at [employer] who presents today for follow-up of obstructive sleep apnea.  Patient is on CPAP with a current setting of 8 cm water.  The patient had a compliance download from today from 9/30/2019-10/29/2019 demonstrated 100% compliance more than 4 hours nightly with an average use of 8 hours and 44 minutes.  Additionally, the patient has an AHI of 1.5 which is unchanged from her's last download in June of this year.  She also did have a follow-up overnight oximetry study which to mistreated no significant desaturations.  This was performed on 7/8/2019.    The patient has no significant daytime somnolence.  She is described as being tired right now, however, she is picking up extra shifts at work due to being short staffed.  The patient also currently has STOP-BANG that is intermediate and [place] score of 2.      Past Medical History:   Diagnosis Date   • Diabetes mellitus (CMS/HCC)    • Elevated cholesterol    • Heart murmur    • HTN (hypertension)    • Hypothyroidism      Past Surgical History:   Procedure Laterality Date   • BREAST EXCISIONAL BIOPSY Left     dr. Amado   • CARPAL TUNNEL RELEASE     • CHOLECYSTECTOMY     • COLONOSCOPY  06/03/2011   • COLONOSCOPY N/A 7/19/2018    Procedure: COLONOSCOPY WITH ANESTHESIA;  Surgeon: Benita Yu MD;  Location: [facility] ENDOSCOPY;  Service: Gastroenterology     Family History   Adopted: Yes   Problem Relation Age of Onset   • Leukemia Mother    • Heart disease Father      Social History     Tobacco Use   • Smoking status: Former Smoker     Types: Cigarettes   • Smokeless tobacco: Never Used   Substance Use Topics   • Alcohol use: No   • Drug use: No       Medications:  Current Outpatient Medications   Medication Sig Dispense  Refill   • aspirin 81 MG EC tablet Take 81 mg by mouth Daily.     • cycloSPORINE (RESTASIS) 0.05 % ophthalmic emulsion Instill 1 drop Both Eyes twice a day 180 each 3   • furosemide (LASIX) 20 MG tablet Take 1 tablet by mouth daily 90 tablet 2   • furosemide (LASIX) 20 MG tablet Take one tablet by mouth daily 90 tablet 2   • hydrochlorothiazide (HYDRODIURIL) 12.5 MG tablet Take 1 tablet by mouth once daily 90 tablet 0   • levothyroxine (SYNTHROID, LEVOTHROID) 88 MCG tablet Take 1 tablet by mouth daily 90 tablet 2   • levothyroxine (SYNTHROID, LEVOTHROID) 88 MCG tablet Take one tablet by mouth daily 90 tablet 2   • losartan (COZAAR) 50 MG tablet Take 1 tablet by mouth once daily 90 tablet 0   • losartan-hydrochlorothiazide (HYZAAR) 50-12.5 MG per tablet Take one tablet by motuh daily 90 tablet 2   • lovastatin (MEVACOR) 10 MG tablet Take 1 tablet by mouth every day 90 tablet 2   • lovastatin (MEVACOR) 10 MG tablet Take one tablet by mouth daily 90 tablet 2   • potassium chloride (K-DUR) 10 MEQ CR tablet Take 1 tablet by mouth daily. 30 tablet 0   • potassium chloride (K-DUR) 10 MEQ CR tablet Take one tablet by mouth daily 90 tablet 2   • sitaGLIPtin-metFORMIN (JANUMET)  MG per tablet Take 1 tablet by mouth two times a day 180 tablet 2   • sitaGLIPtin-metFORMIN (JANUMET)  MG per tablet Take 1 tablet by mouth two times a day 180 tablet 2   • Triamcinolone Acetonide (NASACORT) 55 MCG/ACT nasal inhaler Instill 2 sprays into each nostril daily 10.8 mL 11     No current facility-administered medications for this visit.        Allergies:    Hydrocodone-acetaminophen and Penicillins    Review of Systems:   -A 14 point review of systems is completed and is negative.      Objective      Vital Signs  BP: ()/()   Arterial Line BP: ()/()     Physical Exam:    General Exam:  Head:  Normocephalic, atraumatic.  HEENT: PERRLA.  Full EOM.  Neck:  No lymphadenopathy, thyromegaly or bruit.  Cardiac: Grade II/VI systolic  ejection murmur.  Predominately regular rate and rhythm, however, frequent ectopy appreciated.  Lungs:  Clear to auscultation bilaterally.  No wheeze, rales or rhonchi.  Abdomen:  Non-tender, Non-distended.  Bowel sounds normoactive.  Extremities: Full peripheral pulses.  No clubbing, cyanosis or edema.  Skin: No ulceration, breakdown or rash.      Neurologic Exam:  Mental Status:    -Awake. Alert. Oriented to person, place & time.  -No word finding difficulties.  -No aphasia.  -No dysarthria.  -Follows simple commands.     CN II:  Full visual fields with confrontation.  Pupils equally reactive to light.  CN III, IV, VI:  Extraocular muscles function intact with no nystagmus.  CN V:  Facial sensory is symmetric.  CN VII:  Facial motor symmetric.  CN VIII:  Gross hearing intact bilaterally.  CN IX/X:  Palate elevates symmetrically.  CN XI:  Shoulder shrug symmetric.  CN XII:  Tongue is midline on protrusion.          Results Review:    I reviewed the patient's new clinical results and findings.      Assessment/Plan     Impression:  1.  Obstructive sleep apnea with CPAP compliance  2.  BMI 31.9  3.  Heart murmur of undetermined significance  4.  Frequent ectopy      Plan:  1.  I reviewed the compliance download with the patient as well as her overnight oximetry.  Patient has had no oxygen desaturations, current AHI is 1.5.  She is 100% compliant with therapy.    At this time I recommend that she follow-up annually.  I did recommend continue to follow with primary care for evaluation of her heart murmur.  I do not see any echocardiogram have been done in the past at least within our electronic record system.    2.  Counseled on multimodal approach to treatment of sleep apnea to include but not limited to diet, exercise, sleep hygiene, compliance with pap therapy. Encouraged lateral sleeping position and to avoid sedatives or alcohol close to bedtime. Risks of untreated sleep apnea were discussed to include but not  limited to HTN, heart disease, stroke, cardiac arrhythmia such as AFIB, and dementia.    3.  I have recommended instituting regular cardiovascular exercise in the form of walking, biking or swimming 30-40 minutes at a time at least 3-4 times per week.    The patient voices understanding and agreement with plan of care will call for any concerns or questions in the interim.  She will continue to follow with [facility].  We reviewed pertinent diagnostic studies and the potential risks and benefits of the prescribed regimen of treatment.    We discussed compliance of the prescribed treatment regimen and instructions on medication, therapy, physical activity, etc. and potential for improvement and impact these have on their healing/recovery and risk reduction in the future.    I spent greater than 20 minutes in direct face to face contact with the patient with greater than 50% of the time being spent in education and counseling.          Eugene Cosby PA-C  10/31/19  12:48 PM

## 2020-01-06 ENCOUNTER — TRANSCRIBE ORDERS (OUTPATIENT)
Dept: ADMINISTRATIVE | Facility: HOSPITAL | Age: 60
End: 2020-01-06

## 2020-01-06 DIAGNOSIS — Z12.39 SCREENING BREAST EXAMINATION: Primary | ICD-10-CM

## 2020-01-23 ENCOUNTER — HOSPITAL ENCOUNTER (OUTPATIENT)
Dept: MAMMOGRAPHY | Facility: HOSPITAL | Age: 60
Discharge: HOME | End: 2020-01-23
Admitting: OBSTETRICS & GYNECOLOGY

## 2020-01-23 DIAGNOSIS — Z12.39 SCREENING BREAST EXAMINATION: ICD-10-CM

## 2020-01-23 PROCEDURE — 77067 SCR MAMMO BI INCL CAD: CPT

## 2020-01-23 PROCEDURE — 77063 BREAST TOMOSYNTHESIS BI: CPT

## 2020-01-28 PROCEDURE — G0123 SCREEN CERV/VAG THIN LAYER: HCPCS | Performed by: OBSTETRICS & GYNECOLOGY

## 2020-01-29 ENCOUNTER — LAB REQUISITION (OUTPATIENT)
Dept: LAB | Facility: HOSPITAL | Age: 60
End: 2020-01-29

## 2020-01-29 DIAGNOSIS — Z12.72 ENCOUNTER FOR SCREENING FOR MALIGNANT NEOPLASM OF VAGINA: ICD-10-CM

## 2020-01-30 LAB
GEN CATEG CVX/VAG CYTO-IMP: NORMAL
LAB AP CASE REPORT: NORMAL
LAB AP GYN ADDITIONAL INFORMATION: NORMAL
PATH INTERP SPEC-IMP: NORMAL
STAT OF ADQ CVX/VAG CYTO-IMP: NORMAL

## 2021-01-09 ENCOUNTER — IMMUNIZATION (OUTPATIENT)
Dept: VACCINE CLINIC | Facility: HOSPITAL | Age: 61
End: 2021-01-09

## 2021-01-09 PROCEDURE — 91301 HC SARSCO02 VAC 100MCG/0.5ML IM: CPT | Performed by: OBSTETRICS & GYNECOLOGY

## 2021-01-09 PROCEDURE — 0011A: CPT | Performed by: OBSTETRICS & GYNECOLOGY

## 2021-02-06 ENCOUNTER — IMMUNIZATION (OUTPATIENT)
Dept: VACCINE CLINIC | Facility: HOSPITAL | Age: 61
End: 2021-02-06

## 2021-02-06 PROCEDURE — 91301 HC SARSCO02 VAC 100MCG/0.5ML IM: CPT | Performed by: OBSTETRICS & GYNECOLOGY

## 2021-02-06 PROCEDURE — 0012A: CPT | Performed by: OBSTETRICS & GYNECOLOGY

## 2021-03-02 ENCOUNTER — OFFICE VISIT (OUTPATIENT)
Dept: NEUROLOGY | Facility: CLINIC | Age: 61
End: 2021-03-02

## 2021-03-02 VITALS
OXYGEN SATURATION: 96 % | BODY MASS INDEX: 30.79 KG/M2 | DIASTOLIC BLOOD PRESSURE: 92 MMHG | HEIGHT: 63 IN | WEIGHT: 173.8 LBS | SYSTOLIC BLOOD PRESSURE: 142 MMHG | HEART RATE: 57 BPM

## 2021-03-02 DIAGNOSIS — G47.33 OBSTRUCTIVE SLEEP APNEA: Primary | ICD-10-CM

## 2021-03-02 PROCEDURE — 99213 OFFICE O/P EST LOW 20 MIN: CPT | Performed by: NURSE PRACTITIONER

## 2021-03-02 NOTE — PROGRESS NOTES
"  Neurology Progress Note      Chief Complaint:    Obstructive sleep apnea    Subjective     Subjective:  Ariella Batres is a 60-year-old female who presents to the office today for follow-up of obstructive sleep apnea.  Patient is currently an employee at [employer].  Patient currently has no complaints.  It is important to note, that the patient had eye surgery for cataracts on December 4, 2020 and December 18, 2022.  Following her surgeries she felt uncomfortable wearing her nasal pillow as she was afraid it was going to interfere with recovery of her eye surgery.  Therefore, she did not wear her CPAP device during this time.  She states that at as time passed she slowly began to start feeling her obstructive sleep apnea symptoms return and started to \"feel terrible\".  She began to feel unrested again and was not sleeping well during the night.  The patient states that in the month of February she started wearing her CPAP and began to start feeling better again.  She stated that her sleep was again restored and she was adequately rested.  The patient does not report any sudden awakenings, gasping, excessive snoring, or overwhelming daytime sleepiness when she is using her CPAP.      Past Medical History:   Diagnosis Date   • Diabetes mellitus (CMS/HCC)    • Elevated cholesterol    • Heart murmur    • HTN (hypertension)    • Hypothyroidism      Past Surgical History:   Procedure Laterality Date   • BREAST EXCISIONAL BIOPSY Left     dr. Amado   • CARPAL TUNNEL RELEASE     • CHOLECYSTECTOMY     • COLONOSCOPY  06/03/2011   • COLONOSCOPY N/A 7/19/2018    Procedure: COLONOSCOPY WITH ANESTHESIA;  Surgeon: Benita Yu MD;  Location: [facility] ENDOSCOPY;  Service: Gastroenterology     Family History   Adopted: Yes   Problem Relation Age of Onset   • Leukemia Mother    • Heart disease Father      Social History     Tobacco Use   • Smoking status: Former Smoker     Types: Cigarettes   • Smokeless tobacco: Never " Used   Substance Use Topics   • Alcohol use: No   • Drug use: No       Medications:  Current Outpatient Medications   Medication Sig Dispense Refill   • aspirin 81 MG EC tablet Take 81 mg by mouth Daily.     • cycloSPORINE (RESTASIS) 0.05 % ophthalmic emulsion Instill 1 drop Both Eyes twice a day 180 each 3   • dapagliflozin (Farxiga) 5 MG tablet tablet Take 1 tablet by mouth once daily 90 tablet 1   • furosemide (LASIX) 20 MG tablet Take 1 tablet by mouth once daily 90 tablet 2   • hydroCHLOROthiazide (HYDRODIURIL) 12.5 MG tablet Take 1 tablet by mouth daily 90 tablet 2   • levothyroxine (SYNTHROID, LEVOTHROID) 88 MCG tablet Take 1 tablet by mouth once daily 90 tablet 2   • losartan (COZAAR) 50 MG tablet Take 1 tablet by mouth daily 90 tablet 2   • lovastatin (MEVACOR) 10 MG tablet Take 1 tablet by mouth once daily 90 tablet 2   • meloxicam (MOBIC) 15 MG tablet Take 1 tablet by mouth once daily 90 tablet 1   • potassium chloride 10 MEQ CR tablet Take 1 tablet by mouth once daily 90 tablet 2   • sitaGLIPtin-metFORMIN (Janumet)  MG per tablet Take 1 tablet by mouth two times a day 180 tablet 1     No current facility-administered medications for this visit.        Allergies:    Hydrocodone-acetaminophen and Penicillins    Review of Systems:   -A 14 point review of systems is completed and is negative.      Objective      Vital Signs  Heart Rate:  [57] 57  BP: (142)/(92) 142/92    Physical Exam:  Head:  Normocephalic, atraumatic.  HEENT: PERRLA.  Full EOM.  Mallampati Class 4 anatomy.  Neck:  No lymphadenopathy, thyromegaly or bruit.  Neck circumference is 15 inches.  Cardiac:  Regular rate and rhythm.  Normal S1, S2.  No murmur, rub or gallop.  Lungs:  Clear to auscultation bilaterally.  No wheeze, rales or rhonchi.  Abdomen:  Non-tender, Non-distended.  Bowel sounds normoactive.  Extremities: Full peripheral pulses.  No clubbing, cyanosis or edema.  Skin: No ulceration, breakdown or rash.       Results  Review:  [place] Sleepiness Scale: 9    STOP-BANG: Not completed by the patient.    Compliance report:  Reviewed current compliance report is for 1/30/2021-2/28/2021.  During his report the patient used her CPAP device for 10 out of 30 days for 33% compliance.  Of those days she used the device for greater than 4 hours for 5 days at 17% of the time.  AHI with the days used was 1.1.  Pressure set at 8 cm H2O D[employer].    Chart review:  Previous neurology notes reviewed for interim history with sleep apnea compliance.    Assessment/Plan     Impression:  1. Obstructive sleep apnea  2. Non-compliance with CPAP.  3. Daytime sleepiness      Plan:  1. I have reviewed the current compliance download and findings of the previous polysomnography with the patient in detail.  The patient voices understanding and recognizes the need for adherence to the prescribed therapy.  She understands that a certain level of compliance allows for continued insurance coverage as well as adequate treatment of underlying apnea.  She understands the impact this has upon their overall health status and other medical comorbidities.  2. I have recommended regular cardiovascular exercise in the form of walking, biking or swimming 30-40 minutes at a time at least 3-4 times per week.  3. Counseled on multimodal approach to treatment of sleep apnea to include but not limited to diet, exercise, sleep hygiene, compliance with pap therapy.   4. Encouraged lateral sleeping position and to avoid sedatives or alcohol close to bedtime.   5. Risks of untreated sleep apnea were discussed to include but not limited to HTN, heart disease, stroke, cardiac arrhythmia such as AFIB, and dementia.  6. In regard to the patient not using her CPAP during the month of January and partially during the month of February, we will pull another compliance report in 30 days.  Patient states she is consistently using her CPAP device again and we will check her  AHI and compliance with using the device at that time.  7. The patient will follow up in 1 year with me for annual obstructive sleep apnea compliance.    25 minutes was spent with the patient in assessment, education, and compliance discussion directly with the patient.  This time also includes chart review, results review, and documentation.  Plan of care was discussed with the patient and patient agreed.  Patient will let me know of any concerns or new problems in the interim.      Hari Lo, ROMI  03/02/21  08:54 CST

## 2021-03-13 ENCOUNTER — APPOINTMENT (OUTPATIENT)
Dept: GENERAL RADIOLOGY | Facility: HOSPITAL | Age: 61
End: 2021-03-13

## 2021-03-13 ENCOUNTER — HOSPITAL ENCOUNTER (EMERGENCY)
Facility: HOSPITAL | Age: 61
Discharge: HOME | End: 2021-03-13
Admitting: FAMILY MEDICINE

## 2021-03-13 ENCOUNTER — APPOINTMENT (OUTPATIENT)
Dept: CT IMAGING | Facility: HOSPITAL | Age: 61
End: 2021-03-13

## 2021-03-13 VITALS
TEMPERATURE: 97.8 F | SYSTOLIC BLOOD PRESSURE: 156 MMHG | DIASTOLIC BLOOD PRESSURE: 76 MMHG | RESPIRATION RATE: 16 BRPM | BODY MASS INDEX: 31.18 KG/M2 | WEIGHT: 176 LBS | OXYGEN SATURATION: 98 % | HEART RATE: 69 BPM | HEIGHT: 63 IN

## 2021-03-13 DIAGNOSIS — S80.02XA CONTUSION OF LEFT KNEE, INITIAL ENCOUNTER: Primary | ICD-10-CM
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
## 2021-03-13 DIAGNOSIS — S20.211A CONTUSION OF RIGHT CHEST WALL, INITIAL ENCOUNTER: ICD-10-CM

## 2021-03-13 LAB
ALBUMIN SERPL-MCNC: 4 G/DL (ref 3.5–5.2)
ALBUMIN/GLOB SERPL: 1.3 G/DL
ALP SERPL-CCNC: 84 U/L (ref 39–117)
ALT SERPL W P-5'-P-CCNC: 41 U/L (ref 1–33)
ANION GAP SERPL CALCULATED.3IONS-SCNC: 15 MMOL/L (ref 5–15)
AST SERPL-CCNC: 35 U/L (ref 1–32)
BASOPHILS # BLD AUTO: 0.07 10*3/MM3 (ref 0–0.2)
BASOPHILS NFR BLD AUTO: 0.7 % (ref 0–1.5)
BILIRUB SERPL-MCNC: 0.2 MG/DL (ref 0–1.2)
BUN SERPL-MCNC: 13 MG/DL (ref 6–20)
BUN/CREAT SERPL: 16.3 (ref 7–25)
CALCIUM SPEC-SCNC: 9.3 MG/DL (ref 8.6–10.5)
CHLORIDE SERPL-SCNC: 102 MMOL/L (ref 98–107)
CO2 SERPL-SCNC: 26 MMOL/L (ref 22–29)
CREAT SERPL-MCNC: 0.8 MG/DL (ref 0.57–1)
DEPRECATED RDW RBC AUTO: 45.7 FL (ref 37–54)
EOSINOPHIL # BLD AUTO: 0.22 10*3/MM3 (ref 0–0.4)
EOSINOPHIL NFR BLD AUTO: 2.3 % (ref 0.3–6.2)
ERYTHROCYTE [DISTWIDTH] IN BLOOD BY AUTOMATED COUNT: 15.5 % (ref 12.3–15.4)
GFR SERPL CREATININE-BSD FRML MDRD: 73 ML/MIN/1.73
GLOBULIN UR ELPH-MCNC: 3 GM/DL
GLUCOSE SERPL-MCNC: 149 MG/DL (ref 65–99)
HCT VFR BLD AUTO: 39.8 % (ref 34–46.6)
HGB BLD-MCNC: 12.8 G/DL (ref 12–15.9)
IMM GRANULOCYTES # BLD AUTO: 0.04 10*3/MM3 (ref 0–0.05)
IMM GRANULOCYTES NFR BLD AUTO: 0.4 % (ref 0–0.5)
LYMPHOCYTES # BLD AUTO: 1.51 10*3/MM3 (ref 0.7–3.1)
LYMPHOCYTES NFR BLD AUTO: 16 % (ref 19.6–45.3)
MCH RBC QN AUTO: 26.2 PG (ref 26.6–33)
MCHC RBC AUTO-ENTMCNC: 32.2 G/DL (ref 31.5–35.7)
MCV RBC AUTO: 81.4 FL (ref 79–97)
MONOCYTES # BLD AUTO: 0.48 10*3/MM3 (ref 0.1–0.9)
MONOCYTES NFR BLD AUTO: 5.1 % (ref 5–12)
NEUTROPHILS NFR BLD AUTO: 7.09 10*3/MM3 (ref 1.7–7)
NEUTROPHILS NFR BLD AUTO: 75.5 % (ref 42.7–76)
NRBC BLD AUTO-RTO: 0 /100 WBC (ref 0–0.2)
PLATELET # BLD AUTO: 257 10*3/MM3 (ref 140–450)
PMV BLD AUTO: 9.8 FL (ref 6–12)
POTASSIUM SERPL-SCNC: 3.6 MMOL/L (ref 3.5–5.2)
PROT SERPL-MCNC: 7 G/DL (ref 6–8.5)
RBC # BLD AUTO: 4.89 10*6/MM3 (ref 3.77–5.28)
SODIUM SERPL-SCNC: 143 MMOL/L (ref 136–145)
WBC # BLD AUTO: 9.41 10*3/MM3 (ref 3.4–10.8)

## 2021-03-13 PROCEDURE — 80053 COMPREHEN METABOLIC PANEL: CPT | Performed by: PHYSICIAN ASSISTANT

## 2021-03-13 PROCEDURE — 73564 X-RAY EXAM KNEE 4 OR MORE: CPT

## 2021-03-13 PROCEDURE — 73552 X-RAY EXAM OF FEMUR 2/>: CPT

## 2021-03-13 PROCEDURE — 85025 COMPLETE CBC W/AUTO DIFF WBC: CPT | Performed by: PHYSICIAN ASSISTANT

## 2021-03-13 PROCEDURE — 99283 EMERGENCY DEPT VISIT LOW MDM: CPT

## 2021-03-13 PROCEDURE — 71260 CT THORAX DX C+: CPT

## 2021-03-13 PROCEDURE — 25010000002 IOPAMIDOL 61 % SOLUTION: Performed by: PHYSICIAN ASSISTANT

## 2021-03-13 PROCEDURE — 72125 CT NECK SPINE W/O DYE: CPT

## 2021-03-13 RX ORDER — CYCLOBENZAPRINE HCL 10 MG
10 TABLET ORAL 3 TIMES DAILY PRN
Qty: 21 TABLET | Refills: 0 | Status: SHIPPED | OUTPATIENT
Start: 2021-03-13

## 2021-03-13 RX ORDER — KETOROLAC TROMETHAMINE 10 MG/1
10 TABLET, FILM COATED ORAL EVERY 6 HOURS PRN
Qty: 9 TABLET | Refills: 0 | Status: SHIPPED | OUTPATIENT
Start: 2021-03-13 | End: 2023-03-14

## 2021-03-13 RX ADMIN — IOPAMIDOL 100 ML: 612 INJECTION, SOLUTION INTRAVENOUS at 19:05
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
## 2021-03-15 ENCOUNTER — EPISODE CHANGES (OUTPATIENT)
Dept: CASE MANAGEMENT | Facility: OTHER | Age: 61
End: 2021-03-15

## 2021-05-12 ENCOUNTER — TRANSCRIBE ORDERS (OUTPATIENT)
Dept: ADMINISTRATIVE | Facility: HOSPITAL | Age: 61
End: 2021-05-12

## 2021-05-12 DIAGNOSIS — Z12.31 OTHER SCREENING MAMMOGRAM: Primary | ICD-10-CM

## 2021-09-27 ENCOUNTER — HOSPITAL ENCOUNTER (OUTPATIENT)
Dept: MAMMOGRAPHY | Facility: HOSPITAL | Age: 61
Discharge: HOME | End: 2021-09-27
Admitting: FAMILY MEDICINE

## 2021-09-27 DIAGNOSIS — Z12.31 OTHER SCREENING MAMMOGRAM: ICD-10-CM

## 2021-09-27 PROCEDURE — 77063 BREAST TOMOSYNTHESIS BI: CPT

## 2021-09-27 PROCEDURE — 77067 SCR MAMMO BI INCL CAD: CPT

## 2021-11-19 ENCOUNTER — IMMUNIZATION (OUTPATIENT)
Dept: VACCINE CLINIC | Facility: HOSPITAL | Age: 61
End: 2021-11-19

## 2021-11-19 DIAGNOSIS — Z23 NEED FOR VACCINATION: Primary | ICD-10-CM

## 2021-11-19 PROCEDURE — 91306 HC SARSCOV2 VAC 50MCG/0.25ML IM: CPT | Performed by: OBSTETRICS & GYNECOLOGY

## 2021-11-19 PROCEDURE — 0064A HC ADM SARSCOV2 50MCG/0.25ML BOOSTER: CPT | Performed by: OBSTETRICS & GYNECOLOGY

## 2022-03-03 ENCOUNTER — OFFICE VISIT (OUTPATIENT)
Dept: NEUROLOGY | Facility: CLINIC | Age: 62
End: 2022-03-03

## 2022-03-03 VITALS
HEIGHT: 63 IN | SYSTOLIC BLOOD PRESSURE: 128 MMHG | DIASTOLIC BLOOD PRESSURE: 80 MMHG | BODY MASS INDEX: 29.59 KG/M2 | WEIGHT: 167 LBS | RESPIRATION RATE: 20 BRPM | HEART RATE: 87 BPM | OXYGEN SATURATION: 97 %

## 2022-03-03 DIAGNOSIS — G47.33 OBSTRUCTIVE SLEEP APNEA: Primary | ICD-10-CM

## 2022-03-03 PROCEDURE — 99213 OFFICE O/P EST LOW 20 MIN: CPT | Performed by: NURSE PRACTITIONER
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
## 2023-03-14 ENCOUNTER — OFFICE VISIT (OUTPATIENT)
Dept: NEUROLOGY | Facility: CLINIC | Age: 63
End: 2023-03-14
Payer: COMMERCIAL

## 2023-03-14 VITALS
OXYGEN SATURATION: 96 % | BODY MASS INDEX: 30.12 KG/M2 | WEIGHT: 170 LBS | HEIGHT: 63 IN | HEART RATE: 51 BPM | DIASTOLIC BLOOD PRESSURE: 68 MMHG | SYSTOLIC BLOOD PRESSURE: 122 MMHG

## 2023-03-14 DIAGNOSIS — G47.33 OBSTRUCTIVE SLEEP APNEA: Primary | ICD-10-CM

## 2023-03-14 PROCEDURE — 99213 OFFICE O/P EST LOW 20 MIN: CPT | Performed by: NURSE PRACTITIONER

## 2023-03-14 NOTE — PROGRESS NOTES
Neurology Progress Note    Chief Complaint:    Obstructive Sleep Apnea    Subjective   History of Present Illness:  Lola Batres is a 62 y.o. female who presents today for obstructive sleep apnea.  She is routinely followed by Gordo Trotter Jr., MD for primary care.     Obstructive Sleep Apnea  She notes that she has been waking up at night again.  She notes that she will doze on and off the rest of the night. She can fall asleep easily initially at night.  She notes that she will attempt to use the CPAP every night and she will also unintentionally take her mask off at night.  She reports some daytime hypersomnolence as well. She also notes some changes in her medications with her PCP.     Allergies:    Hydrocodone-acetaminophen and Penicillins    Medications:  Current Outpatient Medications   Medication Sig Dispense Refill   • aspirin 81 MG EC tablet Take 1 tablet by mouth Daily.     • cyclobenzaprine (FLEXERIL) 10 MG tablet Take 1 tablet by mouth 3 (Three) Times a Day As Needed for Muscle Spasms. 21 tablet 0   • dapagliflozin (Farxiga) 5 MG tablet tablet Take 1 tablet by mouth Daily. 90 tablet 1   • empagliflozin (JARDIANCE) 10 MG tablet tablet Take 1 tablet by mouth Daily.     • hydroCHLOROthiazide (HYDRODIURIL) 12.5 MG tablet Take 1 tablet by mouth Daily. 90 tablet 1   • HYDROcodone-acetaminophen (NORCO) 7.5-325 MG per tablet take 1 tablet by mouth every 6 hours as needed for pain 14 tablet 0   • ibuprofen (ADVIL,MOTRIN) 600 MG tablet take 1 tablet by mouth every 6 hours as needed for pain 16 tablet 0   • levothyroxine (SYNTHROID, LEVOTHROID) 88 MCG tablet Take 1 tablet by mouth Daily. 90 tablet 1   • losartan (COZAAR) 100 MG tablet Take 1 tablet by mouth Daily for blood pressure 90 tablet 1   • lovastatin (MEVACOR) 10 MG tablet Take 1 tablet by mouth Daily. 90 tablet 3   • sitaGLIPtin-metFORMIN (Janumet)  MG per tablet Take 1 tablet by mouth 2 (Two) Times a Day. 180 tablet 1   • triamcinolone  "(KENALOG) 0.1 % cream Apply thin layer to affected skin twice a day as needed 80 g 1     No current facility-administered medications for this visit.     Current outpatient and discharge medications have been reconciled for the patient.  Reviewed by: ROMI Alanis    Past Medical History:  Past Medical History:   Diagnosis Date   • Diabetes mellitus (HCC)    • Elevated cholesterol    • Heart murmur    • HTN (hypertension)    • Hypothyroidism      Past Surgical History:   Procedure Laterality Date   • BREAST EXCISIONAL BIOPSY Left     dr. Amado   • CARPAL TUNNEL RELEASE     • CHOLECYSTECTOMY     • COLONOSCOPY  06/03/2011   • COLONOSCOPY N/A 7/19/2018    Procedure: COLONOSCOPY WITH ANESTHESIA;  Surgeon: Benita Yu MD;  Location: [facility] ENDOSCOPY;  Service: Gastroenterology     Family History   Adopted: Yes   Problem Relation Age of Onset   • Leukemia Mother    • Heart disease Father      Social History     Tobacco Use   • Smoking status: Former     Types: Cigarettes   • Smokeless tobacco: Never   Substance Use Topics   • Alcohol use: No   • Drug use: No     Review of Systems   Psychiatric/Behavioral: Positive for sleep disturbance.         Objective   Vital Signs:  Heart Rate:  [51] 51  BP: (122)/(68) 122/68      03/14/23  0912   Weight: 77.1 kg (170 lb)     160 cm (63\")  Body mass index is 30.11 kg/m².    Physical Exam  Vitals reviewed.   Constitutional:       Appearance: Normal appearance.   HENT:      Head: Normocephalic.      Mouth/Throat:      Pharynx: Oropharynx is clear.   Eyes:      General: Lids are normal.      Extraocular Movements: Extraocular movements intact.      Pupils: Pupils are equal, round, and reactive to light.   Cardiovascular:      Rate and Rhythm: Normal rate and regular rhythm.      Pulses: Normal pulses.   Pulmonary:      Effort: Pulmonary effort is normal.   Musculoskeletal:         General: Normal range of motion.      Cervical back: Normal range of motion and neck supple. "   Skin:     General: Skin is warm and dry.      Capillary Refill: Capillary refill takes less than 2 seconds.   Neurological:      Motor: Motor strength is normal.      Coordination: Coordination is intact.      Deep Tendon Reflexes: Reflexes are normal and symmetric.   Psychiatric:         Mood and Affect: Mood normal.         Speech: Speech normal.       Neurological Exam  Mental Status  Awake, alert and oriented to person, place and time. Recent and remote memory are intact. Speech is normal. Language is fluent with no aphasia. Attention and concentration are normal.    Cranial Nerves  CN II: Visual acuity is normal. Visual fields full to confrontation.  CN III, IV, VI: Extraocular movements intact bilaterally. Normal lids and orbits bilaterally. Pupils equal round and reactive to light bilaterally.  CN V: Facial sensation is normal.  CN VII: Full and symmetric facial movement.  CN IX, X: Palate elevates symmetrically. Normal gag reflex.  CN XI: Shoulder shrug strength is normal.  CN XII: Tongue midline without atrophy or fasciculations.    Motor   Strength is 5/5 throughout all four extremities.    Sensory  Sensation is intact to light touch, pinprick, vibration and proprioception in all four extremities.    Reflexes  Deep tendon reflexes are 2+ and symmetric in all four extremities.    Coordination    Finger-to-nose, rapid alternating movements and heel-to-shin normal bilaterally without dysmetria.    Gait  Normal casual, toe, heel and tandem gait.    Neck Circumference: 16.5 inches  Mallampati Score: 2    [place] Sleepiness Scale: 1  STOP-BANG: High    Compliance Report:  This report is for the dates of 12/2/2023-3/13/2020.  Patient is advised to 20 4/3/30 days for an 80% compliance.  Of those patient use a device for greater than 4 hours for 7 days for 23% compliance.  Patient is currently set on CPAP 8 cm H2O.  Current AHI is 1.1.     Results Review:    Lab Results   Component Value Date    GLUCOSE 149 (H)  03/13/2021    BUN 13 03/13/2021    CREATININE 0.80 03/13/2021    EGFRIFNONA 73 03/13/2021    BCR 16.3 03/13/2021    K 3.6 03/13/2021    CO2 26.0 03/13/2021    CALCIUM 9.3 03/13/2021    ALBUMIN 4.00 03/13/2021    AST 35 (H) 03/13/2021    ALT 41 (H) 03/13/2021     Lab Results   Component Value Date    WBC 9.41 03/13/2021    HGB 12.8 03/13/2021    HCT 39.8 03/13/2021    MCV 81.4 03/13/2021     03/13/2021     No results found for: CHOL, CHLPL, TRIG, HDL, LDL, LDLDIRECT  No results found for: TSH  No results found for: HGBA1C  No results found for: FOLATE  No results found for: [id number]       Plan .  Assessment:  Lola Batres is a 62 y.o. female who presents with obstructive sleep apnea.  She continues to report daytime somnolence and multiple awakenings at night.  She indicates that she tries use her CPAP but does have noncompliance according to her compliance report.  I do recommend increasing compliance to help control her sleep apnea symptoms at this time.  Additionally, we will get overnight oximetry on CPAP therapy to ensure that there is proper oxygenation with use of her CPAP.  I explained all this to the patient in complete detail and she states complete understanding.    Plan:  • Continue CPAP.  Encouraged Compliance  • Recommend a regular sleep schedule and sleep hygiene  • Discussed risks of untreated sleep apnea  • Recommend regular physical activity and a balanced diet  • Call me with any questions or concerns.    The patient and I have discussed the plan of care and she is in full agreement at this time.     Follow-Up:  Return in about 3 months (around 6/14/2023) for Obstructive sleep apnea.       ROMI Alanis  03/14/23  09:38 CDT

## 2023-03-24 ENCOUNTER — TELEPHONE (OUTPATIENT)
Dept: NEUROLOGY | Facility: CLINIC | Age: 63
End: 2023-03-24
Payer: COMMERCIAL

## 2023-03-24 NOTE — TELEPHONE ENCOUNTER
CALLED PATIENT TO LET THEM KNOW THAT WE RECEIVED THEIR OVERNIGHT OXIMETRY REPORT AND THERE WERE NO SIGNIFICANT DESATURATIONS. PATIENT DID NOT ANSWER BUT I DID LEAVE A DETAILED VOICEMAIL AND TOLD TO CALL BACK WITH QUESTIONS

## 2023-03-24 NOTE — TELEPHONE ENCOUNTER
----- Message from ROMI Alanis sent at 3/23/2023 12:44 PM CDT -----  No significant desaturations.  Please let her know.     ----- Message -----  From: Gloria Basilio  Sent: 3/23/2023  11:55 AM CDT  To: ROMI Alanis

## 2023-08-29 ENCOUNTER — TELEPHONE (OUTPATIENT)
Dept: NEUROLOGY | Age: 63
End: 2023-08-29

## 2023-08-29 NOTE — TELEPHONE ENCOUNTER
Recived a referral from [facility] office for this patient. Called and spoke with patient  about getting patient scheduled for her 1yr sleep appointment (new patient) with Estelle Davidson for June 2024. Patient was not home. Patient  will have patient call our office back.

## 2024-05-28 ENCOUNTER — TELEPHONE (OUTPATIENT)
Dept: NEUROLOGY | Facility: CLINIC | Age: 64
End: 2024-05-28
Payer: COMMERCIAL

## 2024-05-28 RX ORDER — LEVOTHYROXINE SODIUM 88 UG/1
88 TABLET ORAL DAILY
COMMUNITY

## 2024-05-28 RX ORDER — TRIAMCINOLONE ACETONIDE 1 MG/G
CREAM TOPICAL 2 TIMES DAILY
COMMUNITY

## 2024-05-28 RX ORDER — DAPAGLIFLOZIN 5 MG/1
5 TABLET, FILM COATED ORAL EVERY MORNING
COMMUNITY

## 2024-05-28 RX ORDER — IBUPROFEN 600 MG/1
600 TABLET ORAL EVERY 6 HOURS PRN
COMMUNITY

## 2024-05-28 RX ORDER — LOSARTAN POTASSIUM 100 MG/1
100 TABLET ORAL DAILY
COMMUNITY

## 2024-05-28 RX ORDER — HYDROCHLOROTHIAZIDE 12.5 MG/1
12.5 TABLET ORAL DAILY
COMMUNITY

## 2024-05-28 RX ORDER — CYCLOBENZAPRINE HCL 10 MG
10 TABLET ORAL 3 TIMES DAILY PRN
COMMUNITY

## 2024-05-28 RX ORDER — LOVASTATIN 10 MG/1
10 TABLET ORAL NIGHTLY
COMMUNITY

## 2024-05-28 RX ORDER — ASPIRIN 81 MG/1
81 TABLET ORAL DAILY
COMMUNITY

## 2024-05-28 NOTE — TELEPHONE ENCOUNTER
Explained that we have the PSG and titration study from 2019, I will fax those.  They will need to call medical records at the hospital if they need reports dated before that time.

## 2024-05-28 NOTE — TELEPHONE ENCOUNTER
Caller: BRUCE    Relationship: Provider    Best call back number: [phone]    Caller requesting test results: JULIO MORALES    What test was performed: SLEEP STUDY    When was the test performed: 2/14/19    Where was the test performed: [facility]    Additional notes: PLEASE SEND ALL SLEEP STUDIES FOR CONTINUATION OF CARE.

## 2025-06-11 ENCOUNTER — TRANSCRIBE ORDERS (OUTPATIENT)
Dept: ADMINISTRATIVE | Facility: HOSPITAL | Age: 65
End: 2025-06-11
Payer: COMMERCIAL

## 2025-06-11 DIAGNOSIS — Z12.31 ENCOUNTER FOR SCREENING MAMMOGRAM FOR MALIGNANT NEOPLASM OF BREAST: Primary | ICD-10-CM

## 2025-06-30 ENCOUNTER — HOSPITAL ENCOUNTER (OUTPATIENT)
Dept: MAMMOGRAPHY | Facility: HOSPITAL | Age: 65
Discharge: HOME | End: 2025-06-30
Admitting: FAMILY MEDICINE
Payer: COMMERCIAL

## 2025-06-30 DIAGNOSIS — Z12.31 ENCOUNTER FOR SCREENING MAMMOGRAM FOR MALIGNANT NEOPLASM OF BREAST: ICD-10-CM

## 2025-06-30 PROCEDURE — 77067 SCR MAMMO BI INCL CAD: CPT

## 2025-06-30 PROCEDURE — 77063 BREAST TOMOSYNTHESIS BI: CPT

## (undated) DEVICE — CUFF,BP,DISP,1 TUBE,ADULT,HP: Brand: MEDLINE

## (undated) DEVICE — Device: Brand: DEFENDO AIR/WATER/SUCTION AND BIOPSY VALVE

## (undated) DEVICE — TBG SMPL FLTR LINE NASL 02/C02 A/ BX/100

## (undated) DEVICE — THE CHANNEL CLEANING BRUSH IS A NYLON FLEXI BRUSH ATTACHED TO A FLEXIBLE PLASTIC SHEATH DESIGNED TO SAFELY REMOVE DEBRIS FROM FLEXIBLE ENDOSCOPES.

## (undated) DEVICE — SENSR O2 OXIMAX FNGR A/ 18IN NONSTR

## (undated) DEVICE — ENDOGATOR AUXILIARY WATER JET CONNECTOR: Brand: ENDOGATOR

## (undated) DEVICE — MASK,OXYGEN,MED CONC,ADLT,7' TUB, UC: Brand: PENDING